# Patient Record
Sex: MALE | Race: WHITE
[De-identification: names, ages, dates, MRNs, and addresses within clinical notes are randomized per-mention and may not be internally consistent; named-entity substitution may affect disease eponyms.]

---

## 2017-12-21 ENCOUNTER — HOSPITAL ENCOUNTER (EMERGENCY)
Dept: HOSPITAL 61 - ER | Age: 52
Discharge: HOME | End: 2017-12-21
Payer: COMMERCIAL

## 2017-12-21 VITALS — HEIGHT: 70 IN | BODY MASS INDEX: 31.78 KG/M2 | WEIGHT: 222 LBS

## 2017-12-21 VITALS — SYSTOLIC BLOOD PRESSURE: 128 MMHG | DIASTOLIC BLOOD PRESSURE: 77 MMHG
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
## 2017-12-21 DIAGNOSIS — Z87.01: ICD-10-CM
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
## 2017-12-21 DIAGNOSIS — S82.62XA: ICD-10-CM
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
## 2017-12-21 DIAGNOSIS — S82.832A: Primary | ICD-10-CM

## 2017-12-21 PROCEDURE — 73590 X-RAY EXAM OF LOWER LEG: CPT

## 2017-12-21 PROCEDURE — 73610 X-RAY EXAM OF ANKLE: CPT

## 2017-12-21 PROCEDURE — 29515 APPLICATION SHORT LEG SPLINT: CPT

## 2017-12-21 PROCEDURE — 96374 THER/PROPH/DIAG INJ IV PUSH: CPT

## 2017-12-21 PROCEDURE — 99284 EMERGENCY DEPT VISIT MOD MDM: CPT

## 2017-12-21 PROCEDURE — 73630 X-RAY EXAM OF FOOT: CPT

## 2017-12-21 RX ADMIN — FENTANYL CITRATE PRN MCG: 50 INJECTION INTRAMUSCULAR; INTRAVENOUS at 06:51

## 2017-12-21 RX ADMIN — FENTANYL CITRATE PRN MCG: 50 INJECTION INTRAMUSCULAR; INTRAVENOUS at 06:34

## 2017-12-21 NOTE — PHYS DOC
Past Medical History


Past Medical History:  Pneumonia


Past Surgical History:  No Surgical History


Alcohol Use:  None


Drug Use:  None





Adult General


Chief Complaint


Chief Complaint:  LOWEREXTREMITY INJURY





HPI


HPI


 52-year-old male presenting to the emergency department today after slipping 

on water when he injured the lateral portion of his left ankle. He has pain 

that is sharp moderate intermittent worse with walking and without alleviating 

factors. He reports being on antibiotics for recent pneumonia diagnosis. He 

denies any knee pain or hip pain proximally. He denies any pain in his foot.





Review of systems is negative for chest pain shortness of breath back pain neck 

pain head injury loss of consciousness numbness weakness tingling. All other 

review of systems is negative unless otherwise noted in history of present 

illness.





ED course: 52-year-old male presenting to the emergency department today with 

left ankle pain found to have a ankle fracture on x-ray. I discussed the case 

with Dr. de our orthopedic surgeon who recommended a stirrup splint, 

crutches nonweightbearing to follow-up with him in 5-7 days. The patient was 

given IV fentanyl for pain control and then discharged home with oral pain 

medications. The patient was then discharged home in stable condition to follow 

up with their primary care physician over the next 2-3 days. They were to 

return if their symptoms worsened or if they were concerned for any reason. Face

-to-face discharge instructions and return precautions were given. Patient's 

questions were answered to their satisfaction. Patient is comfortable plan.





Review of Systems


Review of Systems


SEE ABOVE.





Current Medications


Current Medications





Current Medications








 Medications


  (Trade)  Dose


 Ordered  Sig/[facility]  Start Time


 Stop Time Status Last Admin


Dose Admin


 


 Fentanyl Citrate


  (Fentanyl 2ml


 Vial)  50 mcg  PRN Q30MIN  PRN  12/21/17 06:15


    12/21/17 06:51


50 MCG











Allergies


Allergies





Allergies








Coded Allergies Type Severity Reaction Last Updated Verified


 


  No Known Drug Allergies    12/21/17 No











Physical Exam


Physical Exam


SEE ABOVE


Constitutional: Well developed, well nourished, no acute distress, non-toxic 

appearance. []


HENT: Normocephalic, atraumatic, bilateral external ears normal, oropharynx 

moist, no oral exudates, nose normal. []


Eyes: PERRLA, EOMI, conjunctiva normal, no discharge. [] 


Neck: Normal range of motion, no tenderness, supple, no stridor. [] 


Cardiovascular:Heart rate regular rhythm, no murmur []


Lungs & Thorax:  Bilateral breath sounds clear to auscultation []


Abdomen: Bowel sounds normal, soft, no tenderness, no masses, no pulsatile 

masses. [] 


Skin: Warm, dry, no erythema, no rash. [] 


Back: No tenderness, no CVA tenderness. [] 


Extremities: No tenderness, no cyanosis, no clubbing, ROM intact, no edema. [] 


Neurologic: Alert and oriented X 3, normal motor function, normal sensory 

function, no focal deficits noted. []


Psychologic: Affect normal, judgement normal, mood normal. []





Current Patient Data


Vital Signs





 Vital Signs








  Date Time  Temp Pulse Resp B/P (MAP) Pulse Ox O2 Delivery O2 Flow Rate FiO2


 


12/21/17 06:51   18  98 Room Air  


 


12/21/17 04:30 98.7 96      





 98.7       











EKG


EKG


[]





Radiology/Procedures


Radiology/Procedures


[]X-rays were obtained of the left tibia fibula, ankle, and foot. Reviewed by 

myself. Patient has a left fibular fracture.





Course & Med Decision Making


Course & Med Decision Making


Pertinent Labs and Imaging studies reviewed. (See chart for details)





[]





Dragon Disclaimer


Dragon Disclaimer


This electronic medical record was generated, in whole or in part, using a 

voice recognition dictation system.





Departure


Departure


Impression:  


 Primary Impression:  


 Ankle fracture, left


 Additional Impression:  


 Ankle fracture, lateral malleolus, closed


Disposition:  01 HOME, SELF-CARE


Condition:  STABLE


Referrals:  


NO PCP (PCP)








TODD DE II, MD


Patient Instructions:  Ankle Fracture





Additional Instructions:  


Thank you for allowing us to participate in your care today.





Followup with Dr. De in 5-7 days. Do not put weight on the left leg, use 

crutches.  Call your Primary Doctor tomorrow and inform them of your visit 

today.  If you do not have a primary care provider you can ask for a list of 

our primary care providers. Return to the emergency department you have any new 

or concerning findings.





This should be evaluated by the primary care physician and any necessary 

consulting services for continued management within a few days after discharge. 

Return to emergency room if you have any  new or concerning symptoms including 

but not limited to fever, chills, nausea, vomiting, intractable pain, any new 

rashes, chest pain, shortness of air, uncontrolled bleeding, difficulty 

breathing, and/or vision loss.





You may have been prescribed medication that can change in your level of 

thinking and ability to operate machinery. These medications include 

hydrocodone and Ativan. Also, Benadryl has been known to do this as well. Be 

sure to check with your pharmacist and ask if the medications you've prescribed 

can affect your level of consciousness. I recommend not operating heavy 

machinery or driving while on medication such as these.





Problem Qualifiers











RENALDO BERRIOS MD Dec 21, 2017 06:58

## 2017-12-21 NOTE — RAD
Indication: Fall and left foot pain.



Time of exam 0524 hours.



3 views of the left foot were obtained.



There are significant degenerative changes at the first MTP joint. Joint space

narrowing and marginal spurring is present. The metatarsals appear intact. The

phalanges appear intact. The midfoot and hindfoot are unremarkable.



Impression: Degenerative changes. No acute bony abnormality is detected.

## 2017-12-21 NOTE — RAD
Indication: Fall and ankle pain.



Time of exam 0523 hours.



3 views of the left ankle demonstrate an obliquely oriented fracture through

the distal fibula in the supra syndesmotic location. No displacement or

angulation is seen. Ankle mortise is maintained. The talar dome is smooth.



Impression: Obliquely oriented suprasyndesmotic distal fibular fracture.

## 2017-12-21 NOTE — RAD
Indication: Fall and left leg pain.



Time of exam 0521 hours.



2 views of the tibia and fibula were obtained. Images again demonstrate a

fracture involving the distal fibula, obliquely oriented. This is above the

syndesmosis. The tibia appears intact. Alignment at the knee and ankle is

normal.



Impression: Distal fibular fracture.

## 2018-03-28 ENCOUNTER — HOSPITAL ENCOUNTER (OUTPATIENT)
Dept: HOSPITAL 61 - KCIC | Age: 53
Discharge: HOME | End: 2018-03-28
Attending: FAMILY MEDICINE
Payer: COMMERCIAL

## 2018-03-28 DIAGNOSIS — R05: Primary | ICD-10-CM

## 2018-03-28 PROCEDURE — 71046 X-RAY EXAM CHEST 2 VIEWS: CPT
